# Patient Record
Sex: FEMALE | Race: WHITE | Employment: FULL TIME | URBAN - METROPOLITAN AREA
[De-identification: names, ages, dates, MRNs, and addresses within clinical notes are randomized per-mention and may not be internally consistent; named-entity substitution may affect disease eponyms.]

---

## 2022-05-12 ENCOUNTER — HOSPITAL ENCOUNTER (OUTPATIENT)
Dept: GENERAL RADIOLOGY | Age: 37
Discharge: HOME | End: 2022-05-12
Payer: COMMERCIAL

## 2022-05-12 ENCOUNTER — HOSPITAL ENCOUNTER (OUTPATIENT)
Age: 37
Discharge: HOME | End: 2022-05-12
Payer: COMMERCIAL

## 2022-05-12 DIAGNOSIS — S97.81XA CRUSH INJURY OF FOOT, RIGHT, INITIAL ENCOUNTER: ICD-10-CM

## 2022-05-12 PROCEDURE — 73630 X-RAY EXAM OF FOOT: CPT

## 2022-11-19 ENCOUNTER — ANCILLARY PROCEDURE (OUTPATIENT)
Dept: EMERGENCY DEPT | Age: 37
End: 2022-11-19
Payer: COMMERCIAL

## 2022-11-19 ENCOUNTER — HOSPITAL ENCOUNTER (EMERGENCY)
Age: 37
Discharge: HOME | End: 2022-11-19
Attending: EMERGENCY MEDICINE
Payer: COMMERCIAL

## 2022-11-19 VITALS
BODY MASS INDEX: 38.53 KG/M2 | HEIGHT: 61 IN | SYSTOLIC BLOOD PRESSURE: 134 MMHG | OXYGEN SATURATION: 99 % | DIASTOLIC BLOOD PRESSURE: 79 MMHG | RESPIRATION RATE: 14 BRPM | HEART RATE: 97 BPM | WEIGHT: 204.1 LBS | TEMPERATURE: 98.9 F

## 2022-11-19 DIAGNOSIS — L08.9 INFECTED SEBACEOUS CYST OF SKIN: Primary | ICD-10-CM

## 2022-11-19 DIAGNOSIS — L72.3 INFECTED SEBACEOUS CYST OF SKIN: Primary | ICD-10-CM

## 2022-11-19 PROCEDURE — 99284 EMERGENCY DEPT VISIT MOD MDM: CPT

## 2022-11-19 PROCEDURE — 87070 CULTURE OTHR SPECIMN AEROBIC: CPT

## 2022-11-19 PROCEDURE — 87077 CULTURE AEROBIC IDENTIFY: CPT

## 2022-11-19 PROCEDURE — 10030 IMG GID FLU COLL DRG SFT TIS: CPT

## 2022-11-19 PROCEDURE — 87205 SMEAR GRAM STAIN: CPT

## 2022-11-19 RX ORDER — CEPHALEXIN 500 MG/1
500 CAPSULE ORAL 4 TIMES DAILY
Qty: 28 CAPSULE | Refills: 0 | Status: SHIPPED | OUTPATIENT
Start: 2022-11-19 | End: 2022-11-26

## 2022-11-19 RX ORDER — DOXYCYCLINE HYCLATE 100 MG
100 TABLET ORAL 2 TIMES DAILY
Qty: 14 TABLET | Refills: 0 | Status: SHIPPED | OUTPATIENT
Start: 2022-11-19 | End: 2022-11-26

## 2022-11-19 ASSESSMENT — PAIN DESCRIPTION - LOCATION: LOCATION: BACK

## 2022-11-19 ASSESSMENT — PAIN DESCRIPTION - FREQUENCY: FREQUENCY: CONTINUOUS

## 2022-11-19 ASSESSMENT — PAIN SCALES - GENERAL: PAINLEVEL_OUTOF10: 10

## 2022-11-19 ASSESSMENT — PAIN DESCRIPTION - ORIENTATION: ORIENTATION: MID

## 2022-11-19 ASSESSMENT — PAIN DESCRIPTION - DESCRIPTORS: DESCRIPTORS: PRESSURE

## 2022-11-19 ASSESSMENT — PAIN DESCRIPTION - ONSET: ONSET: ON-GOING

## 2022-11-19 ASSESSMENT — PAIN DESCRIPTION - PAIN TYPE: TYPE: ACUTE PAIN

## 2022-11-19 ASSESSMENT — PAIN - FUNCTIONAL ASSESSMENT
PAIN_FUNCTIONAL_ASSESSMENT: 0-10
PAIN_FUNCTIONAL_ASSESSMENT: ACTIVITIES ARE NOT PREVENTED

## 2022-11-19 NOTE — DISCHARGE INSTRUCTIONS
You were found to have an infected sebaceous cyst of the back. When the infection is clear, ultimately the cyst will require removal by a general surgeon or dermatologist as the cyst will grow back and could develop into an abscess again. Take the antibiotics as directed.   Return for worsening symptoms, fever, weakness, numbness or any other concerns

## 2022-11-19 NOTE — ED PROVIDER NOTES
CHIEF COMPLAINT  Back Pain (Redness swelling middle back )      HISTORY OF PRESENT ILLNESS  Long Joaquin is a 40 y.o. female presenting for evaluation of abscess. Patient states over the past several days, she has had increased pain, redness to the area of her upper back. She states because the pain had worsened, she came in today. No fevers nausea or vomiting no weakness or numbness in the extremities no history of IV drug use. She states that she previously has had a sebaceous cyst on her scalp. No other complaints, modifying factors or associated symptoms. I have reviewed the following from the nursing documentation. Past Medical History:   Diagnosis Date    Bipolar disorder ([facility] [place] [address]     PTSD (post-traumatic stress disorder)     Stomach ulcer      History reviewed. No pertinent surgical history. History reviewed. No pertinent family history. Social History     Socioeconomic History    Marital status: Single     Spouse name: Not on file    Number of children: Not on file    Years of education: Not on file    Highest education level: Not on file   Occupational History    Not on file   Tobacco Use    Smoking status: Every Day     Types: Cigarettes    Smokeless tobacco: Never   Substance and Sexual Activity    Alcohol use: Not Currently    Drug use: Not Currently    Sexual activity: Not on file   Other Topics Concern    Not on file   Social History Narrative    Not on file     Social Determinants of Health     Financial Resource Strain: Not on file   Food Insecurity: Not on file   Transportation Needs: Not on file   Physical Activity: Not on file   Stress: Not on file   Social Connections: Not on file   Intimate Partner Violence: Not on file   Housing Stability: Not on file     No current facility-administered medications for this encounter.      Current Outpatient Medications   Medication Sig Dispense Refill    Misc Natural Products (SERENOL PO) Take by mouth      Topiramate (TOPAMAX PO) Take by mouth cephALEXin (KEFLEX) 500 MG capsule Take 1 capsule by mouth 4 times daily for 7 days 28 capsule 0    doxycycline hyclate (VIBRA-TABS) 100 MG tablet Take 1 tablet by mouth 2 times daily for 7 days 14 tablet 0     Allergies   Allergen Reactions    Tramadol Itching       REVIEW OF SYSTEMS  Positive and pertinent negatives as per HPI. All other systems were reviewed and are negative. PHYSICAL EXAM  /79   Pulse 97   Temp 98.9 °F (37.2 °C) (Oral)   Resp 14   Ht 5' 1\" (1.549 m)   Wt 204 lb 1.6 oz (92.6 kg)   SpO2 99%   BMI 38.56 kg/m²   GENERAL APPEARANCE: Awake and alert. Cooperative. HEAD: Normocephalic. Atraumatic. LUNGS: Respirations unlabored without accessory muscle use. EXTREMITIES: No peripheral edema. No acute deformities. There is an area of redness, fluctuance just right of midline of the thoracic region  SKIN: Warm and dry. No acute rashes. NEUROLOGICAL: Alert and oriented X 3. No focal deficits    LABS  I have reviewed all labs for this visit. No results found for this visit on 11/19/22. RADIOLOGY  X-RAYS:  I have reviewed radiologic plain film image(s). ALL OTHER NON-PLAIN FILM IMAGES SUCH AS CT, ULTRASOUND AND MRI HAVE BEEN READ BY THE RADIOLOGIST.   POC US SOFT TISSUE ABSCESS DRAINAGE PERC   Final Result             POC US SOFT TISSUE ABSCESS DRAINAGE PERC    Result Date: 11/19/2022  POCUS_Proc_Abscess:     Exam Information:          Exam type:  Clinically indicated     Indication(s) for Exam:          The exam was performed with the following indications[de-identified]  Soft tissue pain, Swelling     Location:          Specific site of the abscess[de-identified]  middle back     Complications:          None     Interpretation:          Successful U/S-guided abscess drainage  Electronically signed by Ashley Hdz on Saturday, November 19, 2022 at 9:02 AM : Attending:           PROCEDURES    PROCEDURE:  Ya Knight  Doug Dalal or their surrogate had an opportunity to ask questions, and the risks, benefits, and alternatives were discussed. The abscess was prepped and draped to maintain a sterile field. A local anesthetic was used to completely anesthetize the abscess. A linear incision was made to keep the abscess open so it will continue to drain. I There were no complications during the procedure. During the patient's ED course, the patient was given:  Medications - No data to display     ED COURSE/MDM  Patient seen and evaluated. Patient presenting with infected sebaceous cyst of the mid back. This is confirmed with bedside ultrasound that shows a discrete mass/abscess without vascular flow. Overall this is superficial to a depth of 2 cm. She has no symptoms of epidural abscess. Patient tolerated bedside I&D, wound culture sent. She will be started on doxycycline and Keflex. The patient will be discharged from the emergency department. The patient was counseled on their diagnosis and any medications prescribed. They were advised on the need for PCP followup. They were counseled on the need to return to the emergency department if any of their symptoms were to worsen, change or have any other concerns. Discharged in stable condition. Patient was given scripts for the following medications. I counseled patient how to take these medications. New Prescriptions    CEPHALEXIN (KEFLEX) 500 MG CAPSULE    Take 1 capsule by mouth 4 times daily for 7 days    DOXYCYCLINE HYCLATE (VIBRA-TABS) 100 MG TABLET    Take 1 tablet by mouth 2 times daily for 7 days       CLINICAL IMPRESSION  1. Infected sebaceous cyst of skin        Blood pressure 134/79, pulse 97, temperature 98.9 °F (37.2 °C), temperature source Oral, resp. rate 14, height 5' 1\" (1.549 m), weight 204 lb 1.6 oz (92.6 kg), SpO2 99 %. DISPOSITION  Raymundo Riedel was discharged to home in stable condition.      This chart was generated in part by using Dragon Dictation system and may contain errors related to that system including errors in grammar, punctuation, and spelling, as well as words and phrases that may be inappropriate. If there are any questions or concerns please feel free to contact the dictating provider for clarification.        Bernardino Nunez MD  11/19/22 [id number]

## 2022-11-21 LAB
GRAM STAIN RESULT: ABNORMAL
ORGANISM: ABNORMAL
WOUND/ABSCESS: ABNORMAL